# Patient Record
Sex: FEMALE | Race: BLACK OR AFRICAN AMERICAN
[De-identification: names, ages, dates, MRNs, and addresses within clinical notes are randomized per-mention and may not be internally consistent; named-entity substitution may affect disease eponyms.]

---

## 2017-08-30 NOTE — NUR
IV SITE PATENT. NO S/S OF INFILTRATION NOTED. PT DENIES PAIN OR DISCOMFORT TO
SITE. CALL LIGHT W/IN REACH. WILL CONTINUE TO MONITOR.

## 2017-08-30 NOTE — NUR
ASSUMED CARE . PT RESTING QUIETLY IN BED. DENIES PAIN OR ANY DISCOMFORT AT
THIS TIME. SR ON THE MONITOR, DENIES CP. RESP. EVEN AND UNLABORED. ON ROOM
AIR, NO DISTRESS NOTED. IVF, NS AT 100ML/HR, INTACT AND INFUSING VIA RT HAND,
SITE CLEAR. ABLE TO TURN AND REPOSITION SELF IN BED. CALL LIGHT WITHIN REACH.
WILL CONTINUE TO MONITOR.

## 2017-08-30 NOTE — NUR
PT RESTING ON GURNEY IN POSITION OF COMFORT. RESPS E/U. NO S/S OF DISTRESS
NOTED. CALL LIGHT W/IN REACH. WILL CONTINUE TO MONITOR.

## 2017-08-30 NOTE — NUR
RECEIVED PT FROM ED VIA [facility], CAME IN DUE TO ABDOMINAL PAIN. AAOX4. NO SOB
NOTED. DENIES CHEST PAIN/PRESSURE, NSR ON THE MONITOR. STATED THAT SHE HAS
MILD ABDOMINAL PAIN. DENIES NAUSEA/VOMITING. SIDE RAILS UPX2. CALL LIGHT ON
REACH. ENDORSED

## 2017-08-30 NOTE — NUR
DR. ROSS HERE SEEN THE PT. W/ NEW ORDERS MADE PT. TO KEEP NPO NOW .PT. MADE
AWARE. DENIES ANY PAIN AT THIS TIME.CALLLIGHT W/ IN REACH.

## 2017-08-30 NOTE — NUR
PT REPORTS SHE DOES NOT WANT TO BE ADMITTED. STS SHE HAS A SICK  AT HOME
W/NO ONE TO BE ABLE TO CARE FOR HIM. DR. FERNANDO MADE AWARE.

## 2017-08-30 NOTE — NUR
ASSUMED PT. CARE AWAKE ,ALERT AND ORIENTED. DENIES ABD. PAIN AT THIS TIME NO
N/V.CONT. IV FLUIDS AS ORDERED. NO ACUTE DISTRESS NOTED. ABLE TO AMBULATE IN
THE BATHROOM AND VOIDING WELL.CALL LIGHT W/ IN REACH.

## 2017-08-30 NOTE — NUR
RECEIVED PT IN BED AAOX4 NO ACUTE DISTRESS NOTED, PT DENY ABD PAIN AT THE
MOMENT BS ACTIVE X4 QUADRANT , ABD SOFT NON TENDER , LUNG SOUNDS CTA , ON TELE
NUMBER 35 THAT SHOWS NSR HR 78. PIV TO RIGHT HAND INTACT INFUSING WELL . CALL
LIGHT WITHIN PT'S REACH , WILL CON'T TO MONITOR AND ASSIST PT WITH CARE.

## 2017-08-31 NOTE — NUR
DISCHARGED INSTRUCTIONS GIVEN AND DISCUSSED TO PT. AND VERBALIZED
UNDERSTANDING OF INSTRUCTIONS GIVEN ,PT. WENT HOME W/ STABLE CONDITION
AMBULATORY ACCOMPANIED BY CNA IN THE LOBY PT. DRIVE HERSELF TO GO HOME.NO
ACUTE DISTRESS NOTED.

## 2017-08-31 NOTE — NUR
SLEPT WELL. AFEBRILE AND VITAL SIGNS STABLE.RESP. EVEN AND UNLABORED. NO
DISTRESS NOTED. DENIES ABD. PAIN, BUT HAD 2 LARGE LOOSE BMS. IVF INTACT AND
INFUSING WELL, SITE CLEAR. KEPT COMFORTABLE. WILL ENDORSE TO INCOMING NURSE.

## 2017-08-31 NOTE — NUR
AWAKE,ALERT AND ORIENTED,DENIES ABD. PAIN AT THIS TIME. CONT.IV FLUIDS AS
ORDERED. AMBULATED IN THE BATHROOM AND VOIDING WELL. DENIES N/V NO DIARRHEA
NOTED THIS AM.ATE BREAKFAST FAIRLY.NOACUTE DISTRESS NOTED. WILL CONT. PLAN OF
CARE.

## 2017-08-31 NOTE — NUR
DENIES ABD.PAIN NO ACUTE DISTRESS NOTED. AMBULATED IN THE BATHROOM AND VOIDING
WELL.CALL LIGHT W/ IN REACH.

## 2020-05-17 ENCOUNTER — HOSPITAL ENCOUNTER (EMERGENCY)
Dept: HOSPITAL 1 - ED | Age: 85
Discharge: HOME | End: 2020-05-17
Payer: COMMERCIAL

## 2020-05-17 VITALS — BODY MASS INDEX: 26.98 KG/M2 | WEIGHT: 158 LBS | HEIGHT: 64 IN

## 2020-05-17 VITALS — SYSTOLIC BLOOD PRESSURE: 132 MMHG | DIASTOLIC BLOOD PRESSURE: 68 MMHG

## 2020-05-17 DIAGNOSIS — J01.90: Primary | ICD-10-CM

## 2020-05-17 DIAGNOSIS — M10.9: ICD-10-CM

## 2020-05-17 DIAGNOSIS — Z90.710: ICD-10-CM

## 2020-05-17 DIAGNOSIS — I10: ICD-10-CM

## 2020-05-17 DIAGNOSIS — Z85.3: ICD-10-CM

## 2023-04-27 NOTE — NUR
[facility] PT ALERT AND ORIENTED SPEAKING IN CLEAR COMPLETE
SENTENCES. EKG DONE IN TRIAGE SHOWN TO DR. LOYA IN NO DISTRESS AT THIS TIME. Sarecycline Counseling: Patient advised regarding possible photosensitivity and discoloration of the teeth, skin, lips, tongue and gums.  Patient instructed to avoid sunlight, if possible.  When exposed to sunlight, patients should wear protective clothing, sunglasses, and sunscreen.  The patient was instructed to call the office immediately if the following severe adverse effects occur:  hearing changes, easy bruising/bleeding, severe headache, or vision changes.  The patient verbalized understanding of the proper use and possible adverse effects of sarecycline.  All of the patient's questions and concerns were addressed.

## 2025-02-12 NOTE — NUR
I HAVE REVIEWED THE DATA COLLECTION BY HARSHAD (NAME):RAJENDRA DUVAL
ENTERED ON (DATE/TIME):8/30/17 1957
 
I CONCUR WITH THE DATA AND ANY EXCEPTIONS OR COMMENTS ARE LISTED BELOW: 0